# Patient Record
Sex: MALE | Race: WHITE
[De-identification: names, ages, dates, MRNs, and addresses within clinical notes are randomized per-mention and may not be internally consistent; named-entity substitution may affect disease eponyms.]

---

## 2017-05-03 ENCOUNTER — HOSPITAL ENCOUNTER (OUTPATIENT)
Dept: HOSPITAL 39 - GMAL | Age: 72
Discharge: HOME | End: 2017-05-03
Attending: FAMILY MEDICINE
Payer: MEDICARE

## 2017-05-03 DIAGNOSIS — D51.3: ICD-10-CM

## 2017-05-03 DIAGNOSIS — Z12.5: Primary | ICD-10-CM

## 2017-05-03 PROCEDURE — 82607 VITAMIN B-12: CPT

## 2017-11-27 ENCOUNTER — HOSPITAL ENCOUNTER (OUTPATIENT)
Dept: HOSPITAL 39 - GMAL | Age: 72
Discharge: HOME | End: 2017-11-27
Attending: FAMILY MEDICINE
Payer: MEDICARE

## 2017-11-27 DIAGNOSIS — D51.3: Primary | ICD-10-CM

## 2017-11-27 DIAGNOSIS — E55.9: ICD-10-CM

## 2017-11-29 ENCOUNTER — HOSPITAL ENCOUNTER (OUTPATIENT)
Dept: HOSPITAL 39 - GMAL | Age: 72
Discharge: HOME | End: 2017-11-29
Attending: FAMILY MEDICINE
Payer: MEDICARE

## 2017-11-29 DIAGNOSIS — D53.9: Primary | ICD-10-CM

## 2018-01-18 ENCOUNTER — HOSPITAL ENCOUNTER (OUTPATIENT)
Dept: HOSPITAL 39 - GMAL | Age: 73
Discharge: HOME | End: 2018-01-18
Attending: FAMILY MEDICINE
Payer: MEDICARE

## 2018-01-18 DIAGNOSIS — D53.9: Primary | ICD-10-CM

## 2018-10-03 ENCOUNTER — HOSPITAL ENCOUNTER (OUTPATIENT)
Dept: HOSPITAL 39 - CT | Age: 73
End: 2018-10-03
Attending: FAMILY MEDICINE
Payer: MEDICARE

## 2018-10-03 DIAGNOSIS — I65.23: Primary | ICD-10-CM

## 2018-10-04 NOTE — CT
Procedure:  CT NECK ANGIOGRAPHY WITH IV CONTRAST        



Exam Date:  10/4/2018



Ordering Provider:  SAMUEL SOLOMON



Clinical Indication:  Carotid STENOSIS



Comparison: None



Technique:  Using a helical scanner, sequential axial imaging of

the neck was obtained after the administration of intravenous

contrast medium. Coronal and sagittal MIPS of the arteries of the

neck were obtained. This exam was performed according to our

departmental dose optimization program which includes use of

automated exposure control, adjustment of the mA and/or kV

according to patient size and/or use of iterative reconstruction

technique.



Findings: 

There are scattered atherosclerotic plaque throughout the

arteries of the neck.

The innominate artery is patent. 

The visualized subclavian arteries are patent. 

The right common carotid artery is patent.

There is approximately 70% stenosis of the proximal right

internal carotid artery.

Approximately 50% stenosis of the mid left common carotid artery.

The left internal carotid artery is patent.

The basilar artery is patent. 

The vertebral arteries are patent. 



IMPRESSION: 

1. There is approximately 70% stenosis of the proximal right

internal carotid artery.

2. Approximately 50% stenosis of the mid left common carotid

artery.



Electronically signed by:  Lawson Pittman MD  10/4/2018 6:36 AM CDT

Workstation: [id number]

## 2019-08-27 ENCOUNTER — HOSPITAL ENCOUNTER (OUTPATIENT)
Dept: HOSPITAL 39 - GMAL | Age: 74
End: 2019-08-27
Attending: FAMILY MEDICINE
Payer: MEDICARE

## 2019-08-27 DIAGNOSIS — R53.83: ICD-10-CM

## 2019-08-27 DIAGNOSIS — E78.2: ICD-10-CM

## 2019-08-27 DIAGNOSIS — D51.3: Primary | ICD-10-CM

## 2019-08-27 DIAGNOSIS — E11.9: ICD-10-CM

## 2019-08-27 DIAGNOSIS — E55.9: ICD-10-CM

## 2019-08-27 DIAGNOSIS — I10: ICD-10-CM

## 2019-08-27 DIAGNOSIS — Z85.46: ICD-10-CM

## 2019-11-20 ENCOUNTER — HOSPITAL ENCOUNTER (OUTPATIENT)
Dept: HOSPITAL 39 - GMAL | Age: 74
Discharge: HOME | End: 2019-11-20
Attending: FAMILY MEDICINE
Payer: MEDICARE

## 2019-11-20 DIAGNOSIS — E83.42: ICD-10-CM

## 2019-11-20 DIAGNOSIS — R53.83: Primary | ICD-10-CM

## 2020-01-20 ENCOUNTER — HOSPITAL ENCOUNTER (OUTPATIENT)
Dept: HOSPITAL 39 - GMAL | Age: 75
End: 2020-01-20
Attending: FAMILY MEDICINE
Payer: MEDICARE

## 2020-01-20 DIAGNOSIS — I50.89: ICD-10-CM

## 2020-01-20 DIAGNOSIS — E78.2: ICD-10-CM

## 2020-01-20 DIAGNOSIS — I10: ICD-10-CM

## 2020-01-20 DIAGNOSIS — D50.8: Primary | ICD-10-CM

## 2020-01-20 DIAGNOSIS — R53.83: ICD-10-CM
